# Patient Record
Sex: MALE | URBAN - METROPOLITAN AREA
[De-identification: names, ages, dates, MRNs, and addresses within clinical notes are randomized per-mention and may not be internally consistent; named-entity substitution may affect disease eponyms.]

---

## 2019-04-10 ENCOUNTER — IMPORTED ENCOUNTER (OUTPATIENT)
Dept: URBAN - METROPOLITAN AREA CLINIC 31 | Facility: CLINIC | Age: 69
End: 2019-04-10

## 2019-04-10 PROBLEM — Z96.1: Noted: 2019-04-10

## 2019-04-10 PROBLEM — Z94.7: Noted: 2019-04-10

## 2019-04-10 PROBLEM — H17.89: Noted: 2019-04-10

## 2019-04-10 PROBLEM — H59.811: Noted: 2019-04-10

## 2019-04-10 PROBLEM — H40.89: Noted: 2019-04-10

## 2019-04-10 PROBLEM — T86.840: Noted: 2019-04-10

## 2019-04-10 PROCEDURE — 99203 OFFICE O/P NEW LOW 30 MIN: CPT

## 2019-04-10 NOTE — PATIENT DISCUSSION
1.  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 Start Durezol Q1hrs and Tobradex linden QHS. 2. Transplant Doing well after transplant. OD DMEK  Rejection and vaccinations reviewed. 3. Pseudophakia OU - IOLs stable. Monitor. 4. H/O RD OD s/p repair - stableRD sx reviewed pt to call if new sx.5. S/P  Lasik:stable6.  s/p tube shunt IOP excellent  Discussed importance of compliance. Will continue to monitor. 7.  Return for an appointment in 1 week for office call. with Dr. Manjit Mcdaniels.

## 2019-04-17 ENCOUNTER — IMPORTED ENCOUNTER (OUTPATIENT)
Dept: URBAN - METROPOLITAN AREA CLINIC 31 | Facility: CLINIC | Age: 69
End: 2019-04-17

## 2019-04-17 PROBLEM — H17.89: Noted: 2019-04-17

## 2019-04-17 PROBLEM — T86.840: Noted: 2019-04-17

## 2019-04-17 PROBLEM — H40.89: Noted: 2019-04-17

## 2019-04-17 PROBLEM — Z94.7: Noted: 2019-04-17

## 2019-04-17 PROBLEM — H59.811: Noted: 2019-04-17

## 2019-04-17 PROBLEM — Z96.1: Noted: 2019-04-17

## 2019-04-17 PROCEDURE — 99213 OFFICE O/P EST LOW 20 MIN: CPT

## 2019-04-17 NOTE — PATIENT DISCUSSION
1.  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 better Durezol Q2hrs for a week then q4 and Tobradex linden QHS for a week then dc.2. Transplant Doing well after transplant. OD DMEK  Rejection and vaccinations reviewed. 3. Pseudophakia OU - IOLs stable. Monitor. 4. H/O RD OD s/p repair - stableRD sx reviewed pt to call if new sx.5. S/P  Lasik:stable6.  s/p tube shunt IOP excellent  Discussed importance of compliance. Will continue to monitor. Return for an appointment in 2 weeks for office call. with Dr. Marita Zapien.

## 2019-05-01 ENCOUNTER — IMPORTED ENCOUNTER (OUTPATIENT)
Dept: URBAN - METROPOLITAN AREA CLINIC 31 | Facility: CLINIC | Age: 69
End: 2019-05-01

## 2019-05-01 PROBLEM — H40.89: Noted: 2019-05-01

## 2019-05-01 PROBLEM — T86.840: Noted: 2019-05-01

## 2019-05-01 PROBLEM — H59.811: Noted: 2019-05-01

## 2019-05-01 PROBLEM — Z94.7: Noted: 2019-05-01

## 2019-05-01 PROBLEM — H17.89: Noted: 2019-05-01

## 2019-05-01 PROBLEM — Z96.1: Noted: 2019-05-01

## 2019-05-01 PROCEDURE — 99213 OFFICE O/P EST LOW 20 MIN: CPT

## 2019-05-01 NOTE — PATIENT DISCUSSION
1.  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 better Durezol q4 dc tdx linden add sim 128 drops 3-4x/d2. Transplant Doing well after transplant. OD DMEK  Rejection and vaccinations reviewed. 3. Pseudophakia OU - IOLs stable. Monitor. 4. H/O RD OD s/p repair - stableRD sx reviewed pt to call if new sx.5. S/P  Lasik:stable6.  s/p tube shunt IOP excellent  Discussed importance of compliance. Will continue to monitor. Return for an appointment in 1 month for office call. with Dr. Rojas Mandujano.

## 2019-05-22 ENCOUNTER — IMPORTED ENCOUNTER (OUTPATIENT)
Dept: URBAN - METROPOLITAN AREA CLINIC 31 | Facility: CLINIC | Age: 69
End: 2019-05-22

## 2019-05-22 PROBLEM — Z96.1: Noted: 2019-05-22

## 2019-05-22 PROBLEM — T86.840: Noted: 2019-05-22

## 2019-05-22 PROBLEM — H17.89: Noted: 2019-05-22

## 2019-05-22 PROBLEM — Z94.7: Noted: 2019-05-22

## 2019-05-22 PROBLEM — H40.89: Noted: 2019-05-22

## 2019-05-22 PROBLEM — H59.811: Noted: 2019-05-22

## 2019-05-22 PROCEDURE — 99213 OFFICE O/P EST LOW 20 MIN: CPT

## 2019-05-22 NOTE — PATIENT DISCUSSION
1.  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 better Durezol q4 sim 128 drops 3-4x/d. If does not clear may need repeat DSEK. 2. Transplant Doing well after transplant. OD DMEK  Rejection and vaccinations reviewed. 3. Pseudophakia OU - IOLs stable. Monitor. 4. H/O RD OD s/p repair - stableRD sx reviewed pt to call if new sx.5. S/P  Lasik:stable6.  s/p tube shunt IOP excellent  Discussed importance of compliance. Will continue to monitor. Return for an appointment in 1 month for office call. MRx. with Dr. Salty Luna.

## 2019-07-05 ENCOUNTER — IMPORTED ENCOUNTER (OUTPATIENT)
Dept: URBAN - METROPOLITAN AREA CLINIC 31 | Facility: CLINIC | Age: 69
End: 2019-07-05

## 2019-07-05 PROBLEM — T86.841: Noted: 2019-07-05

## 2019-07-05 PROBLEM — Z96.1: Noted: 2019-07-05

## 2019-07-05 PROBLEM — H40.1132: Noted: 2019-07-05

## 2019-07-05 PROCEDURE — 99213 OFFICE O/P EST LOW 20 MIN: CPT

## 2019-07-05 NOTE — PATIENT DISCUSSION
1.  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 Discussed option of repeat transplant in 3 months if cornea does not clear. Discussed DSEK because of both ACL and tube shunt chance of gas escaping and need for rebubble much higher. t/c scheduling if not better next visit. PREd 4x/d for a month then 3x/d2. Pseudophakia OU - IOLs stable. Monitor. 3. Primary Open Angle Glaucoma OU moderate - Continue with current treatment plan. Discussed importance of compliance. Will continue to monitor. 4.  Return for an appointment in 2 months for office call. MRx. OS with Dr. Nancy Frederick.

## 2019-08-30 ENCOUNTER — IMPORTED ENCOUNTER (OUTPATIENT)
Dept: URBAN - METROPOLITAN AREA CLINIC 31 | Facility: CLINIC | Age: 69
End: 2019-08-30

## 2019-08-30 PROBLEM — T86.841: Noted: 2019-08-30

## 2019-08-30 PROBLEM — H40.89: Noted: 2019-08-30

## 2019-08-30 PROBLEM — Z94.7: Noted: 2019-08-30

## 2019-08-30 PROBLEM — Z96.1: Noted: 2019-08-30

## 2019-08-30 PROBLEM — H40.1132: Noted: 2019-08-30

## 2019-08-30 PROCEDURE — 99213 OFFICE O/P EST LOW 20 MIN: CPT

## 2019-08-30 NOTE — PATIENT DISCUSSION
1.  Graft Failure. Schedule DSEK OS. Reviewed risks and benefits including rejection infection bleeding loss of vision retinal tears detachment. Discussed increased risk of need for rebubble due to ACL and tube shunt. Schedule repeat DSEK OS  PO drops antibiotic Durezol 4x/d Cyclosporin 2% 2x/d. Medical clearance stop ASA 3 days before surgery2. Transplant Doing well after transplant OD. Continue topical steroids. Rejection and vaccinations reviewed. 3. Pseudophakia OU - IOLs stable. Monitor. 4.  s/p tube shunt IOP excellent  Discussed importance of compliance. Will continue to monitor.

## 2019-08-30 NOTE — PATIENT DISCUSSION
Transplant Doing well after transplant OD. Continue topical steroids. Rejection and vaccinations reviewed.

## 2019-10-23 ENCOUNTER — IMPORTED ENCOUNTER (OUTPATIENT)
Dept: URBAN - METROPOLITAN AREA CLINIC 31 | Facility: CLINIC | Age: 69
End: 2019-10-23

## 2019-10-23 PROBLEM — Z94.7: Noted: 2019-10-23

## 2019-10-23 PROCEDURE — 99024 POSTOP FOLLOW-UP VISIT: CPT

## 2019-10-23 NOTE — PATIENT DISCUSSION
Postop doing well. Lie on back face up position. No heavy lifting or straining glasses or shield all times no eye rubbing po drops as directed. Call with any problems. I discussed with patient why graft has not attached. Gas has migrated through PI into vitreous cavity. Need to let tissue recover and let any viscoelastic in [facility] resorb for rebubble to work. If not better next visit plan rebubble next visit.

## 2019-10-28 ENCOUNTER — IMPORTED ENCOUNTER (OUTPATIENT)
Dept: URBAN - METROPOLITAN AREA CLINIC 31 | Facility: CLINIC | Age: 69
End: 2019-10-28

## 2019-10-28 PROBLEM — Z98.89: Noted: 2019-10-28

## 2019-10-28 PROCEDURE — 99024 POSTOP FOLLOW-UP VISIT: CPT

## 2019-10-28 NOTE — PATIENT DISCUSSION
Post Operative:  Graft not attached risks and benefits of rebubble discussed. PLAN rebubble today. po drops as instructed tears prn. Call with any problems. LIE DOwn face up for next 24 hoursReturn for an appointment in 2 days for post op exam. with Dr. River Jones.

## 2019-10-30 ENCOUNTER — IMPORTED ENCOUNTER (OUTPATIENT)
Dept: URBAN - METROPOLITAN AREA CLINIC 31 | Facility: CLINIC | Age: 69
End: 2019-10-30

## 2019-10-30 PROBLEM — Z98.89: Noted: 2019-10-30

## 2019-10-30 PROCEDURE — 99024 POSTOP FOLLOW-UP VISIT: CPT

## 2019-10-30 NOTE — PATIENT DISCUSSION
Post Operative: Doing well po drops as instructed tears prn. Call with any problems.   Glasses or shield all times no eye rubbing no heavy lifting or straining

## 2019-11-06 ENCOUNTER — IMPORTED ENCOUNTER (OUTPATIENT)
Dept: URBAN - METROPOLITAN AREA CLINIC 31 | Facility: CLINIC | Age: 69
End: 2019-11-06

## 2019-11-06 PROBLEM — Z94.7: Noted: 2019-11-06

## 2019-11-06 PROCEDURE — 99024 POSTOP FOLLOW-UP VISIT: CPT

## 2019-11-06 NOTE — PATIENT DISCUSSION
Post-Op  Doing well no heavy lifting or straining glasses or shield all times no heavy lifting or straining. No eye rubbing po drops as directed. Call with any problems. DC besivance continue other drops the sameReturn for an appointment in 3 weeks for post op refraction. ARx MRx and Topography. with Dr. Idania Singh.

## 2019-12-03 ENCOUNTER — IMPORTED ENCOUNTER (OUTPATIENT)
Dept: URBAN - METROPOLITAN AREA CLINIC 31 | Facility: CLINIC | Age: 69
End: 2019-12-03

## 2019-12-03 PROBLEM — Z94.7: Noted: 2019-12-03

## 2019-12-03 PROCEDURE — 99024 POSTOP FOLLOW-UP VISIT: CPT

## 2019-12-03 NOTE — PATIENT DISCUSSION
Post-Op  Doing well Restart Sandimmune 2x/d chronic do not stop durezol 3x/d besivance for 3 days after SR. No eye rubbing po drops as directed. Call with any problems. Return for an appointment in 1 month for office call. ARx MRx and Topography. with Dr. Eulogio Hodge.

## 2020-01-28 ENCOUNTER — IMPORTED ENCOUNTER (OUTPATIENT)
Dept: URBAN - METROPOLITAN AREA CLINIC 31 | Facility: CLINIC | Age: 70
End: 2020-01-28

## 2020-01-28 PROBLEM — Z94.7: Noted: 2020-01-28

## 2020-01-28 PROBLEM — H40.1132: Noted: 2020-01-28

## 2020-01-28 PROCEDURE — 99024 POSTOP FOLLOW-UP VISIT: CPT

## 2020-01-28 PROCEDURE — 92015 DETERMINE REFRACTIVE STATE: CPT

## 2020-01-28 PROCEDURE — 92025 CPTRIZED CORNEAL TOPOGRAPHY: CPT

## 2020-01-28 NOTE — PATIENT DISCUSSION
1.  Post-Op  Doing well no heavy lifting or straining glasses or shield all times no heavy lifting or straining. No eye rubbing po drops as directed. Call with any problems. Increase Durezol to qid OS. 2. Transplant Doing well after transplant. Continue topical steroids. Rejection and vaccinations reviewed. 3. Primary Open Angle Glaucoma OU moderate -  s/p tube shunt IOP hypotony. Increase durezol to 4x/d to see if it increases IOP  Discussed importance of compliance. Will continue to monitor for stability or progression. 4. Return for an appointment in 1 month for office call. MRx and Topography. with Dr. Estefani Jiménez.

## 2020-01-28 NOTE — PATIENT DISCUSSION
Primary Open Angle Glaucoma OU moderate -  s/p tube shunt IOP hypotony. Increase durezol to 4x/d to see if it increases IOP  Discussed importance of compliance. Will continue to monitor for stability or progression.

## 2020-03-03 ENCOUNTER — IMPORTED ENCOUNTER (OUTPATIENT)
Dept: URBAN - METROPOLITAN AREA CLINIC 31 | Facility: CLINIC | Age: 70
End: 2020-03-03

## 2020-03-03 PROBLEM — Z94.7: Noted: 2020-03-03

## 2020-03-03 PROBLEM — Z96.1: Noted: 2020-03-03

## 2020-03-03 PROBLEM — H40.1132: Noted: 2020-03-03

## 2020-03-03 PROCEDURE — 92025 CPTRIZED CORNEAL TOPOGRAPHY: CPT

## 2020-03-03 PROCEDURE — 99213 OFFICE O/P EST LOW 20 MIN: CPT

## 2020-03-03 NOTE — PATIENT DISCUSSION
Primary Open Angle Glaucoma OU moderate -  IOP at target. Discussed importance of compliance. Will continue to monitor for stability or progression.

## 2020-03-03 NOTE — PATIENT DISCUSSION
1.  Transplant Doing well after repeat transplant DSEK OS and DMEK OD. Continue topical steroids. Rejection and vaccinations reviewed. Pt to see Dr Kishore Arcos next visit t/c changing glasses2. Pseudophakia OU - IOLs stable. Monitor for changes in vision. 3. Primary Open Angle Glaucoma OU moderate -  IOP at target. Discussed importance of compliance. Will continue to monitor for stability or progression. 4. Return for an appointment in 3 months for office call. with Dr. Maddison Sauer.

## 2020-09-03 ENCOUNTER — IMPORTED ENCOUNTER (OUTPATIENT)
Dept: URBAN - METROPOLITAN AREA CLINIC 31 | Facility: CLINIC | Age: 70
End: 2020-09-03

## 2020-09-03 PROBLEM — Z94.7: Noted: 2020-09-03

## 2020-09-03 PROBLEM — H40.042: Noted: 2020-09-03

## 2020-09-03 PROBLEM — Z96.1: Noted: 2020-09-03

## 2020-09-03 PROBLEM — H17.89: Noted: 2020-09-03

## 2020-09-03 PROBLEM — H40.051: Noted: 2020-09-03

## 2020-09-03 PROCEDURE — 92012 INTRM OPH EXAM EST PATIENT: CPT

## 2020-09-03 NOTE — PATIENT DISCUSSION
1.  Steroid Induced Glaucoma OS:  Discussed that intraocular pressure is elevated due to steroid medication. IOP creeping up decrease Durezol to 2x/d  Discussed importance of compliance. Will continue to monitor for stability or progression. 2. Ocular HTN OD:  Elevated intraocular pressure without signs of glaucomatous damage to the optic nerve. Will continue to monitor for development of glaucoma. 3. Transplant Doing well after transplant. Continue topical steroids. Durezol 2x/d pt self DC CSA higher risk of rejection add Cequa 4x/d. Rejection and vaccinations reviewed. 4. Pseudophakia OU - IOLs stable. Monitor for changes in vision. 5. S/P  Lasik: monitor6. Return for an appointment in 1 month for pressure check. with Dr. Jose Wright.

## 2020-09-03 NOTE — PATIENT DISCUSSION
Ocular HTN OD:  Elevated intraocular pressure without signs of glaucomatous damage to the optic nerve. Will continue to monitor for development of glaucoma.

## 2020-09-03 NOTE — PATIENT DISCUSSION
Transplant Doing well after transplant. Continue topical steroids. Durezol 2x/d pt self DC CSA higher risk of rejection add Cequa 4x/d. Rejection and vaccinations reviewed.

## 2020-10-07 ENCOUNTER — IMPORTED ENCOUNTER (OUTPATIENT)
Dept: URBAN - METROPOLITAN AREA CLINIC 31 | Facility: CLINIC | Age: 70
End: 2020-10-07

## 2020-10-07 PROBLEM — H40.051: Noted: 2020-10-07

## 2020-10-07 PROBLEM — H40.042: Noted: 2020-10-07

## 2020-10-07 PROBLEM — Z94.7: Noted: 2020-10-07

## 2020-10-07 PROBLEM — Z96.1: Noted: 2020-10-07

## 2020-10-07 PROBLEM — H17.89: Noted: 2020-10-07

## 2020-10-07 PROCEDURE — 99213 OFFICE O/P EST LOW 20 MIN: CPT

## 2020-10-07 NOTE — PATIENT DISCUSSION
1.  Steroid Induced Glaucoma OS:  Discussed that intraocular pressure is elevated due to steroid medication. IOP better continue durezol same. Discussed importance of compliance. Will continue to monitor for stability or progression. 2. Ocular HTN OD:  Elevated intraocular pressure without signs of glaucomatous damage to the optic nerve. Will continue to monitor for development of glaucoma. 3. Transplant Doing well after transplant. Continue topical steroids. Durezol 2x/d pt will continue Cequa and CSA was very irritating. Rejection and vaccinations reviewed. 4. Pseudophakia OU - IOLs stable. Monitor for changes in vision. 5. S/P  Lasik: monitorReturn for an appointment in 3 months for pressure check. with Dr. Kodak Weber.

## 2021-01-08 ENCOUNTER — IMPORTED ENCOUNTER (OUTPATIENT)
Dept: URBAN - METROPOLITAN AREA CLINIC 31 | Facility: CLINIC | Age: 71
End: 2021-01-08

## 2021-01-08 PROBLEM — Z96.1: Noted: 2021-01-08

## 2021-01-08 PROBLEM — H40.042: Noted: 2021-01-08

## 2021-01-08 PROBLEM — H17.89: Noted: 2021-01-08

## 2021-01-08 PROBLEM — H40.051: Noted: 2021-01-08

## 2021-01-08 PROBLEM — Z94.7: Noted: 2021-01-08

## 2021-01-08 PROCEDURE — 99213 OFFICE O/P EST LOW 20 MIN: CPT

## 2021-01-08 NOTE — PATIENT DISCUSSION
1.  Steroid Induced Glaucoma OS:  Discussed that intraocular pressure is elevated due to steroid medication. IOP at target continue durezol same. Discussed importance of compliance. Will continue to monitor for stability or progression. 2. Ocular HTN OD:  Elevated intraocular pressure without signs of glaucomatous damage to the optic nerve. Will continue to monitor for development of glaucoma. 3. Transplant Doing well after transplant. Continue topical steroids. Durezol 2x/d pt will continue Cequa and CSA was very irritating. Rejection and vaccinations reviewed. 4. Pseudophakia OU - IOLs stable. Monitor for changes in vision. 5. S/P  Lasik: monitorReturn for an appointment in 4 months for comprehensive exam. OCT Nerve. with Dr. Rakan Cruz.

## 2021-05-07 ENCOUNTER — IMPORTED ENCOUNTER (OUTPATIENT)
Dept: URBAN - METROPOLITAN AREA CLINIC 31 | Facility: CLINIC | Age: 71
End: 2021-05-07

## 2021-05-07 PROBLEM — H40.051: Noted: 2021-05-07

## 2021-05-07 PROBLEM — H40.042: Noted: 2021-05-07

## 2021-05-07 PROBLEM — Z96.1: Noted: 2021-05-07

## 2021-05-07 PROBLEM — H17.89: Noted: 2021-05-07

## 2021-05-07 PROBLEM — Z94.7: Noted: 2021-05-07

## 2021-05-07 PROCEDURE — 92083 EXTENDED VISUAL FIELD XM: CPT

## 2021-05-07 PROCEDURE — 92015 DETERMINE REFRACTIVE STATE: CPT

## 2021-05-07 PROCEDURE — 92133 CPTRZD OPH DX IMG PST SGM ON: CPT

## 2021-05-07 PROCEDURE — 92014 COMPRE OPH EXAM EST PT 1/>: CPT

## 2021-05-07 NOTE — PATIENT DISCUSSION
1.  Steroid Induced Glaucoma OS:  s/p tube shunt IOP at target. OCT unable to image OS well. Discussed that intraocular pressure is elevated due to steroid medication. IOP at target continue durezol same. Discussed importance of compliance. Will continue to monitor for stability or progression. 2. Ocular HTN OD:  Elevated intraocular pressure without signs of glaucomatous damage to the optic nerve. Will continue to monitor for development of glaucoma. 3. Transplant Doing well after transplant. Continue topical steroids. Durezol 2x/d pt will continue Cequa and CSA was very irritating. Rejection and vaccinations reviewed. 4. Pseudophakia OU - IOLs stable. Monitor for changes in vision. 5. S/P  Lasik: OD monitorReturn for an appointment in 6 months for pressure check. VF 24-2. with Dr. Flavio Frankel.

## 2021-11-19 ENCOUNTER — IMPORTED ENCOUNTER (OUTPATIENT)
Dept: URBAN - METROPOLITAN AREA CLINIC 31 | Facility: CLINIC | Age: 71
End: 2021-11-19

## 2021-11-19 PROBLEM — Z96.1: Noted: 2021-11-19

## 2021-11-19 PROBLEM — H17.89: Noted: 2021-11-19

## 2021-11-19 PROBLEM — H40.051: Noted: 2021-11-19

## 2021-11-19 PROBLEM — H02.834: Noted: 2021-11-19

## 2021-11-19 PROBLEM — H40.042: Noted: 2021-11-19

## 2021-11-19 PROBLEM — Z94.7: Noted: 2021-11-19

## 2021-11-19 PROBLEM — H02.831: Noted: 2021-11-19

## 2021-11-19 PROCEDURE — 99213 OFFICE O/P EST LOW 20 MIN: CPT

## 2021-11-19 PROCEDURE — 92083 EXTENDED VISUAL FIELD XM: CPT

## 2021-11-19 NOTE — PATIENT DISCUSSION
Steroid Induced Glaucoma OS:  Discussed that intraocular pressure is elevated due to steroid medication. Continue with current treatment plan. Discussed importance of compliance. Will continue to monitor for stability or progression.

## 2021-11-19 NOTE — PATIENT DISCUSSION
1.  Transplant Doing well after repeat transplant. Continue topical steroids. H/o rejection and graft failure. Rejection and vaccinations reviewed. Increase durezol to 4x/d for 2 weeks with vaccination and eye surgery2. Steroid Induced Glaucoma OS:  Discussed that intraocular pressure is elevated due to steroid medication. Continue with current treatment plan. Discussed importance of compliance. Will continue to monitor for stability or progression. 3. Pseudophakia OU - IOLs stable. Monitor for changes in vision. 4. S/P  Lasik: OD stable. 5. Ocular HTN OD:  Elevated intraocular pressure without signs of glaucomatous damage to the optic nerve. Will continue to monitor for development of glaucoma. 6. Dermatochalasis OU:  Scheduled to have lid surgery with Dr Cristal Olguin next month. Would be conservative as pt has a stable transplant and glaucoma tube shunt surgery OS and cannot afford to have any exposure7. Return for an appointment in 6 weeks for office call. with Dr. Camilla Crigler.

## 2022-04-02 ASSESSMENT — TONOMETRY
OS_IOP_MMHG: 9
OS_IOP_MMHG: 4
OS_IOP_MMHG: 20
OS_IOP_MMHG: 16
OD_IOP_MMHG: 16
OD_IOP_MMHG: 17
OD_IOP_MMHG: 10
OD_IOP_MMHG: 18
OS_IOP_MMHG: 8
OD_IOP_MMHG: 13
OD_IOP_MMHG: 13
OS_IOP_MMHG: 8
OD_IOP_MMHG: 14
OS_IOP_MMHG: 10
OS_IOP_MMHG: 5
OS_IOP_MMHG: 8
OS_IOP_MMHG: 4
OS_IOP_MMHG: 11
OD_IOP_MMHG: 16
OS_IOP_MMHG: 11
OD_IOP_MMHG: 22
OS_IOP_MMHG: 5
OS_IOP_MMHG: 10
OD_IOP_MMHG: 16
OD_IOP_MMHG: 17
OD_IOP_MMHG: 15
OD_IOP_MMHG: 17

## 2022-04-02 ASSESSMENT — VISUAL ACUITY
OS_CC: 20/80-1
OS_PH: SC 20/50 -2
OS_CC: J5
OD_SC: 20/20
OS_CC: 20/80-1
OD_SC: 20/20
OS_SC: 20/200
OS_PH: SC 20/70 -2
OS_SC: 20/200
OS_SC: 20/400
OS_SC: CF@5'
OS_SC: CF@6'
OS_SC: 20/200
OD_SC: 20/20
OD_CC: J1
OS_CC: 20/100
OS_SC: 20/200
OD_SC: 20/20
OD_SC: 20/20
OD_SC: J5
OS_SC: 20/200
OS_SC: 20/200
OD_CC: 20/50
OD_SC: 20/20
OS_CC: 20/400
OS_PH: SC 20/70
OS_PH: CC 20/70
OD_SC: 20/20-2
OD_SC: 20/20
OD_SC: 20/20
OD_SC: 20/20-1
OD_SC: 20/20
OD_CC: 20/40+2
OS_SC: 20/100-2
OD_SC: 20/25
OD_SC: 20/25-2
OD_SC: 20/20
OS_CC: 20/100
OS_SC: 20/200
OS_CC: 20/80-1
OS_SC: 20/200
OD_SC: 20/20
OS_SC: 20/200
OD_SC: 20/20